# Patient Record
Sex: FEMALE | Race: WHITE | NOT HISPANIC OR LATINO | URBAN - METROPOLITAN AREA
[De-identification: names, ages, dates, MRNs, and addresses within clinical notes are randomized per-mention and may not be internally consistent; named-entity substitution may affect disease eponyms.]

---

## 2017-11-27 LAB — POC BETA-HCG (QUAL): NEGATIVE

## 2019-01-29 ENCOUNTER — HISTORICAL (OUTPATIENT)
Dept: LAB | Facility: HOSPITAL | Age: 39
End: 2019-01-29

## 2019-01-29 LAB
ABS NEUT (OLG): 4.7 X10(3)/MCL (ref 2.1–9.2)
BASOPHILS # BLD AUTO: 0 X10(3)/MCL (ref 0–0.2)
BASOPHILS NFR BLD AUTO: 0 %
EOSINOPHIL # BLD AUTO: 0.6 X10(3)/MCL (ref 0–0.9)
EOSINOPHIL NFR BLD AUTO: 8 %
ERYTHROCYTE [DISTWIDTH] IN BLOOD BY AUTOMATED COUNT: 12.3 % (ref 11.5–17)
HCT VFR BLD AUTO: 43 % (ref 37–47)
HGB BLD-MCNC: 13.6 GM/DL (ref 12–16)
IMM GRANULOCYTES # BLD AUTO: 0.01 % (ref 0–0.02)
IMM GRANULOCYTES NFR BLD AUTO: 0.1 % (ref 0–0.43)
LYMPHOCYTES # BLD AUTO: 1.9 X10(3)/MCL (ref 0.6–4.6)
LYMPHOCYTES NFR BLD AUTO: 25 %
MCH RBC QN AUTO: 30.1 PG (ref 27–31)
MCHC RBC AUTO-ENTMCNC: 31.6 GM/DL (ref 33–36)
MCV RBC AUTO: 95.1 FL (ref 80–94)
MONOCYTES # BLD AUTO: 0.6 X10(3)/MCL (ref 0.1–1.3)
MONOCYTES NFR BLD AUTO: 7 %
NEUTROPHILS # BLD AUTO: 4.7 X10(3)/MCL (ref 1.4–7.9)
NEUTROPHILS NFR BLD AUTO: 60 %
PLATELET # BLD AUTO: 220 X10(3)/MCL (ref 130–400)
PMV BLD AUTO: 10.2 FL (ref 9.4–12.4)
POC BETA-HCG (QUAL): NEGATIVE
RBC # BLD AUTO: 4.52 X10(6)/MCL (ref 4.2–5.4)
T4 FREE SERPL-MCNC: 0.75 NG/DL (ref 0.76–1.46)
TSH SERPL-ACNC: 2.36 MIU/ML (ref 0.36–3.74)
WBC # SPEC AUTO: 7.8 X10(3)/MCL (ref 4.5–11.5)

## 2019-01-30 LAB
HUMAN PAPILLOMAVIRUS (HPV): NORMAL
PAP RECOMMENDATION EXT: NORMAL
PAP SMEAR: NORMAL

## 2019-03-19 ENCOUNTER — HISTORICAL (OUTPATIENT)
Dept: LAB | Facility: HOSPITAL | Age: 39
End: 2019-03-19

## 2019-03-19 LAB
ABS NEUT (OLG): 3.27 X10(3)/MCL (ref 2.1–9.2)
ALBUMIN SERPL-MCNC: 3.8 GM/DL (ref 3.4–5)
ALBUMIN/GLOB SERPL: 1.2 RATIO (ref 1.1–2)
ALP SERPL-CCNC: 53 UNIT/L (ref 46–116)
ALT SERPL-CCNC: 15 UNIT/L (ref 12–78)
APPEARANCE, UA: CLEAR
AST SERPL-CCNC: 11 UNIT/L (ref 15–37)
BACTERIA #/AREA URNS AUTO: ABNORMAL /HPF
BASOPHILS # BLD AUTO: 0 X10(3)/MCL (ref 0–0.2)
BASOPHILS NFR BLD AUTO: 1 %
BILIRUB SERPL-MCNC: 0.7 MG/DL (ref 0.2–1)
BILIRUB UR QL STRIP: NEGATIVE
BILIRUBIN DIRECT+TOT PNL SERPL-MCNC: 0.13 MG/DL (ref 0–0.2)
BILIRUBIN DIRECT+TOT PNL SERPL-MCNC: 0.57 MG/DL (ref 0–0.8)
BUN SERPL-MCNC: 20.2 MG/DL (ref 7–18)
CALCIUM SERPL-MCNC: 9 MG/DL (ref 8.5–10.1)
CHLORIDE SERPL-SCNC: 104 MMOL/L (ref 98–107)
CHOLEST SERPL-MCNC: 209 MG/DL (ref 0–200)
CHOLEST/HDLC SERPL: 3.5 {RATIO} (ref 0–4)
CO2 SERPL-SCNC: 27 MMOL/L (ref 21–32)
COLOR UR: YELLOW
CORTIS SERPL-SCNC: 12 MCG/DL
CREAT SERPL-MCNC: 0.81 MG/DL (ref 0.6–1.3)
EOSINOPHIL # BLD AUTO: 0.4 X10(3)/MCL (ref 0–0.9)
EOSINOPHIL NFR BLD AUTO: 7 %
ERYTHROCYTE [DISTWIDTH] IN BLOOD BY AUTOMATED COUNT: 12.3 % (ref 11.5–17)
EST. AVERAGE GLUCOSE BLD GHB EST-MCNC: 114 MG/DL
GLOBULIN SER-MCNC: 3.2 GM/DL (ref 2.4–3.5)
GLUCOSE (UA): NEGATIVE
GLUCOSE SERPL-MCNC: 95 MG/DL (ref 74–106)
HBA1C MFR BLD: 5.6 % (ref 4.5–6.2)
HCT VFR BLD AUTO: 41.1 % (ref 37–47)
HDLC SERPL-MCNC: 59 MG/DL (ref 40–60)
HGB BLD-MCNC: 13.5 GM/DL (ref 12–16)
HGB UR QL STRIP: NEGATIVE
IMM GRANULOCYTES # BLD AUTO: 0.01 % (ref 0–0.02)
IMM GRANULOCYTES NFR BLD AUTO: 0.2 % (ref 0–0.43)
KETONES UR QL STRIP: NEGATIVE
LDLC SERPL CALC-MCNC: 137 MG/DL (ref 0–129)
LEUKOCYTE ESTERASE UR QL STRIP: NEGATIVE
LYMPHOCYTES # BLD AUTO: 2.2 X10(3)/MCL (ref 0.6–4.6)
LYMPHOCYTES NFR BLD AUTO: 35 %
MCH RBC QN AUTO: 30.1 PG (ref 27–31)
MCHC RBC AUTO-ENTMCNC: 32.8 GM/DL (ref 33–36)
MCV RBC AUTO: 91.5 FL (ref 80–94)
MONOCYTES # BLD AUTO: 0.4 X10(3)/MCL (ref 0.1–1.3)
MONOCYTES NFR BLD AUTO: 7 %
NEUTROPHILS # BLD AUTO: 3.27 X10(3)/MCL (ref 1.4–7.9)
NEUTROPHILS NFR BLD AUTO: 51 %
NITRITE UR QL STRIP.AUTO: NEGATIVE
PH UR STRIP: 7 [PH] (ref 5–9)
PLATELET # BLD AUTO: 231 X10(3)/MCL (ref 130–400)
PMV BLD AUTO: 10 FL (ref 9.4–12.4)
POTASSIUM SERPL-SCNC: 4.4 MMOL/L (ref 3.5–5.1)
PROT SERPL-MCNC: 7 GM/DL (ref 6.4–8.2)
PROT UR QL STRIP: NEGATIVE
RBC # BLD AUTO: 4.49 X10(6)/MCL (ref 4.2–5.4)
RBC #/AREA URNS HPF: ABNORMAL /[HPF]
SODIUM SERPL-SCNC: 141 MMOL/L (ref 136–145)
SP GR UR STRIP: 1.02 (ref 1–1.03)
SQUAMOUS EPITHELIAL, UA: ABNORMAL
T3FREE SERPL-MCNC: 2.7 PG/ML (ref 2.2–4)
T4 FREE SERPL-MCNC: 1.03 NG/DL (ref 0.76–1.46)
TRIGL SERPL-MCNC: 66 MG/DL
TSH SERPL-ACNC: 0.98 MIU/ML (ref 0.36–3.74)
UROBILINOGEN UR STRIP-ACNC: 0.2
VLDLC SERPL CALC-MCNC: 13 MG/DL
WBC # SPEC AUTO: 6.4 X10(3)/MCL (ref 4.5–11.5)
WBC #/AREA URNS AUTO: ABNORMAL /[HPF]

## 2019-03-27 ENCOUNTER — HISTORICAL (OUTPATIENT)
Dept: RADIOLOGY | Facility: HOSPITAL | Age: 39
End: 2019-03-27

## 2019-11-25 ENCOUNTER — HISTORICAL (OUTPATIENT)
Dept: ADMINISTRATIVE | Facility: HOSPITAL | Age: 39
End: 2019-11-25

## 2022-04-10 ENCOUNTER — HISTORICAL (OUTPATIENT)
Dept: ADMINISTRATIVE | Facility: HOSPITAL | Age: 42
End: 2022-04-10

## 2022-04-27 VITALS
HEIGHT: 66 IN | BODY MASS INDEX: 36.21 KG/M2 | SYSTOLIC BLOOD PRESSURE: 106 MMHG | WEIGHT: 225.31 LBS | DIASTOLIC BLOOD PRESSURE: 74 MMHG

## 2022-05-04 NOTE — HISTORICAL [facility]ER
This is a historical note converted from Ne. Formatting and pictures may have been removed.  Please reference Ne for original formatting and attached multimedia. Chief Complaint  referral for left knee pain  History of Present Illness  Patient presents as a new patient. ?States that for the past 6 months, she has had intermittent locking of the knee.? He will get locked in a flexed position and she has to physically press down on the knee and force her to straighten out. ?This happens on a daily basis.? She also has significant pain to the knee?daily.? Is not been getting better or worse over the past 6 months. ?She did have?a venous procedure done?to her bilateral iliac veins?several months ago?because during the work-up for her left knee pain, there was concern that?some of her veins have been collapse.? She got no better from the surgery?and her knee pain persisted. ?She finally got an MRI that which told her that she had?degenerative disease and also a meniscal tear.? She did not have any trauma to the knee. ?No specific inciting event.? She did used to work out?daily, using weights?and also doing?exercises on the floor and also including cardio. ?She is no longer able to do this. ?The only thing that she is able to do is use a stationary bike.? She does not smoke. ?Does not have any other further medical problems. ?She takes Percocet for her pain.  Review of Systems  No chest pain or palpitations  No wheezing or coughing  Physical Exam  Vitals & Measurements  HR:?55(Peripheral)? RR:?12? BP:?106/74?  HT:?167?cm? WT:?102.5?kg? BMI:?36.75? LMP:?11/05/2019 00:00 CST?  No acute distress, alert and oriented  Regular rate on peripheral exam  No increased work of breathing  Abdomen soft nontender nondistended  ?  Left lower extremity  Left knee with a mild effusion  Tender to palpation at the medial joint line  Range of motion 0 to 120 degrees,?10 degrees less than the contralateral  Stable to varus and valgus  stress at 0 30 degrees  Negative Lockmans  Negative posterior sag  No patella symptoms  Positive Angela?and crepitus with motion  Neurovascularly intact distally  ?  X-ray 3 views of left knee reviewed significant for?medial joint space click sclerosis,?with medial osteophytes on the tibia and?distal femur  ?  MRI?report of the left knee?within the referral?paperwork?reviewed significant for?medial meniscal tear  Assessment/Plan  Knee pain, left?M25.562  Ordered:  XR Knee Left 4 or More Views, Routine, 11/25/19 12:36:00 CST, Pain, None, Patient Bed, Rad Type, Knee pain, left, Not Scheduled, 11/25/19 12:36:00 CST  ?  This patient is some degree of arthritis the left knee?but also reports mechanical block to motion?and clicking and catching symptoms.? We would like to see the MRI, as she may be a candidate for arthroscopic meniscal debridement versus?repair.? However, she does have arthritis and I counseled her that?despite?our future interventions,?this may only progress and cause her continued pain.? She will make an appointment for 2 weeks from now and she will get?the MRI disc made and bring it back to her next clinic appointment.   Medications  acetaminophen-hydrocodone 325 mg-10 mg oral tablet, 1 tab(s), Oral, Daily  acetaminophen-hydrocodone 325 mg-5 mg oral tablet, 1 tab(s), Oral, q8hr,? ?Not taking  acetaminophen-oxycodone 325 mg-10 mg oral tablet, 1 tab(s), Oral, q6hr,? ?Not Taking, Completed Rx  buPROPion 150 mg/24 hours (XL) oral tablet, extended release, 150 mg= 1 tab(s), Oral, q24hr  carisoprodol 350 mg oral tablet, 350 mg= 1 tab(s), Oral, BID,? ?Not taking  clonazePAM 1 mg oral tablet, 1 mg= 1 tab(s), Oral, BID,? ?Not taking  clopidogrel 75 mg oral tablet, 75 mg= 1 tab(s), Oral, Daily  levothyroxine 75 mcg (0.075 mg) oral tablet, 75 mcg= 1 tab(s), Oral, qAM  LORAZEPAM 1MG TABLETS, 1 mg= 1 tab(s), Oral, BID  methylPREDNISolone 4 mg oral tab,? ?Not taking  orphenadrine 100 mg oral tablet, extended  release, 100 mg= 1 tab(s), Oral, BID  oxyCODONE 10 mg oral tablet, 10 mg= 1 tab(s), Oral, q6hr,? ?Not taking  Pantoprazole 40 mg ORAL EC-Tablet, 40 mg= 1 tab(s), Oral, Daily  SERTRALINE 100MG TABLETS,? ?Not taking      Renetta Wilkinson medical record was reviewed with?Dr. Chavez.? HPI, PE and treatment plan was reviewed.? Treatment plan was reasonable and necessary.??Imaging was reviewed at the time of visit.??

## 2022-09-21 ENCOUNTER — HISTORICAL (OUTPATIENT)
Dept: ADMINISTRATIVE | Facility: HOSPITAL | Age: 42
End: 2022-09-21

## 2023-05-11 ENCOUNTER — DOCUMENTATION ONLY (OUTPATIENT)
Dept: ADMINISTRATIVE | Facility: HOSPITAL | Age: 43
End: 2023-05-11
Payer: MEDICAID

## 2023-10-19 RX ORDER — CYCLOBENZAPRINE HCL 10 MG
10 TABLET ORAL DAILY
COMMUNITY

## 2023-10-19 RX ORDER — PREDNISONE 1 MG/1
1 TABLET ORAL DAILY
COMMUNITY

## 2023-10-19 RX ORDER — LORAZEPAM 1 MG/1
2 TABLET ORAL DAILY
COMMUNITY

## 2023-10-25 ENCOUNTER — ANESTHESIA EVENT (OUTPATIENT)
Dept: SURGERY | Facility: HOSPITAL | Age: 43
End: 2023-10-25
Payer: MEDICAID

## 2023-10-25 NOTE — ANESTHESIA PREPROCEDURE EVALUATION
10/25/2023  Renetta Gage is a 42 y.o., female.      Pre-op Assessment    I have reviewed the Patient Summary Reports.    I have reviewed the NPO Status.   I have reviewed the Medications.     Review of Systems  Anesthesia Hx:  No problems with previous Anesthesia  Denies Family Hx of Anesthesia complications.   Denies Personal Hx of Anesthesia complications.   Social:  Non-Smoker    Cardiovascular:  Cardiovascular Normal     Pulmonary:  Pulmonary Normal    Renal/:  Renal/ Normal     Hepatic/GI:  Hepatic/GI Normal    Musculoskeletal:  Musculoskeletal Normal    Neurological:  Neurology Normal    Endocrine:  Endocrine Normal    Psych:  Psychiatric Normal           Physical Exam  General: Well nourished, Cooperative, Alert and Oriented    Airway:  Mallampati: I   Mouth Opening: Normal  TM Distance: Normal  Tongue: Normal  Neck ROM: Normal ROM    Dental:  Intact    Chest/Lungs:  Normal Respiratory Rate    Heart:  Rate: Normal    Musculoskeletal:Normal mobility      Anesthesia Plan  Type of Anesthesia, risks & benefits discussed:    Anesthesia Type: MAC  Intra-op Monitoring Plan: Standard ASA Monitors  Post Op Pain Control Plan: multimodal analgesia  Induction:  IV  Informed Consent: Informed consent signed with the Patient and all parties understand the risks and agree with anesthesia plan.  All questions answered.   ASA Score: 2  Day of Surgery Review of History & Physical: H&P Update referred to the surgeon/provider.  Anesthesia Plan Notes: Anesthesia plan was discussed with patient and/or representative. Risks and alternatives were discussed including the possibility of alteration of plan.     Ready For Surgery From Anesthesia Perspective.     .

## 2023-10-26 ENCOUNTER — HOSPITAL ENCOUNTER (OUTPATIENT)
Facility: HOSPITAL | Age: 43
Discharge: HOME | End: 2023-10-26
Attending: INTERNAL MEDICINE | Admitting: INTERNAL MEDICINE
Payer: MEDICAID

## 2023-10-26 ENCOUNTER — ANESTHESIA (OUTPATIENT)
Dept: SURGERY | Facility: HOSPITAL | Age: 43
End: 2023-10-26
Payer: MEDICAID

## 2023-10-26 VITALS
OXYGEN SATURATION: 100 % | HEIGHT: 65 IN | WEIGHT: 155 LBS | DIASTOLIC BLOOD PRESSURE: 71 MMHG | TEMPERATURE: 98 F | RESPIRATION RATE: 16 BRPM | BODY MASS INDEX: 25.83 KG/M2 | HEART RATE: 42 BPM | SYSTOLIC BLOOD PRESSURE: 107 MMHG

## 2023-10-26 DIAGNOSIS — R14.0 ABDOMINAL BLOATING: ICD-10-CM

## 2023-10-26 DIAGNOSIS — M62.89 PELVIC FLOOR DYSFUNCTION IN FEMALE: Primary | ICD-10-CM

## 2023-10-26 DIAGNOSIS — M62.89 PELVIC FLOOR DYSFUNCTION: ICD-10-CM

## 2023-10-26 DIAGNOSIS — R14.3 FLATULENCE: ICD-10-CM

## 2023-10-26 DIAGNOSIS — R19.4 CHANGE IN BOWEL HABITS: ICD-10-CM

## 2023-10-26 DIAGNOSIS — Z80.0 FAMILY HISTORY OF MALIGNANT NEOPLASM OF GASTROINTESTINAL TRACT: ICD-10-CM

## 2023-10-26 LAB — B-HCG SERPL QL: NEGATIVE

## 2023-10-26 PROCEDURE — 00811 ANES LWR INTST NDSC NOS: CPT | Mod: QZ,QS | Performed by: NURSE ANESTHETIST, CERTIFIED REGISTERED

## 2023-10-26 PROCEDURE — 37000009 HC ANESTHESIA EA ADD 15 MINS: Performed by: INTERNAL MEDICINE

## 2023-10-26 PROCEDURE — 45380 COLONOSCOPY AND BIOPSY: CPT | Performed by: INTERNAL MEDICINE

## 2023-10-26 PROCEDURE — 88305 TISSUE EXAM BY PATHOLOGIST: CPT | Performed by: INTERNAL MEDICINE

## 2023-10-26 PROCEDURE — 37000008 HC ANESTHESIA 1ST 15 MINUTES: Performed by: INTERNAL MEDICINE

## 2023-10-26 PROCEDURE — 25000003 PHARM REV CODE 250: Performed by: NURSE ANESTHETIST, CERTIFIED REGISTERED

## 2023-10-26 PROCEDURE — D9220AH HC ANESTHESIA PROFESSIONAL FEE: Mod: QZ,QS | Performed by: NURSE ANESTHETIST, CERTIFIED REGISTERED

## 2023-10-26 PROCEDURE — 88300 SURGICAL PATH GROSS: CPT

## 2023-10-26 PROCEDURE — 81025 URINE PREGNANCY TEST: CPT | Performed by: NURSE ANESTHETIST, CERTIFIED REGISTERED

## 2023-10-26 PROCEDURE — 63600175 PHARM REV CODE 636 W HCPCS: Performed by: NURSE ANESTHETIST, CERTIFIED REGISTERED

## 2023-10-26 PROCEDURE — 27201423 OPTIME MED/SURG SUP & DEVICES STERILE SUPPLY: Performed by: INTERNAL MEDICINE

## 2023-10-26 RX ORDER — SODIUM CHLORIDE 9 MG/ML
INJECTION, SOLUTION INTRAVENOUS CONTINUOUS
Status: ACTIVE | OUTPATIENT
Start: 2023-10-26

## 2023-10-26 RX ORDER — PROPOFOL 10 MG/ML
VIAL (ML) INTRAVENOUS
Status: DISCONTINUED | OUTPATIENT
Start: 2023-10-26 | End: 2023-10-26

## 2023-10-26 RX ORDER — ONDANSETRON 2 MG/ML
INJECTION INTRAMUSCULAR; INTRAVENOUS
Status: DISCONTINUED | OUTPATIENT
Start: 2023-10-26 | End: 2023-10-26

## 2023-10-26 RX ORDER — LIDOCAINE HYDROCHLORIDE 10 MG/ML
INJECTION, SOLUTION EPIDURAL; INFILTRATION; INTRACAUDAL; PERINEURAL
Status: DISCONTINUED | OUTPATIENT
Start: 2023-10-26 | End: 2023-10-26

## 2023-10-26 RX ADMIN — SODIUM CHLORIDE: 9 INJECTION, SOLUTION INTRAVENOUS at 09:10

## 2023-10-26 RX ADMIN — PROPOFOL 150 MG: 10 INJECTION, EMULSION INTRAVENOUS at 09:10

## 2023-10-26 RX ADMIN — ONDANSETRON HYDROCHLORIDE 4 MG: 2 SOLUTION INTRAMUSCULAR; INTRAVENOUS at 09:10

## 2023-10-26 RX ADMIN — LIDOCAINE HYDROCHLORIDE 20 MG: 10 INJECTION, SOLUTION EPIDURAL; INFILTRATION; INTRACAUDAL; PERINEURAL at 09:10

## 2023-10-26 NOTE — OP NOTE
[facility]  Colon Procedure  Operative Note    SUMMARY     Date of Procedure: 10/26/2023     Procedure: Procedure(s) (LRB):  COLONOSCOPY (N/A)    Surgeon(s) and Role:     * Miguel De Oliveira MD - Primary    Assisting Surgeon: None    Patient Name: Renetta Gage      Patient location: OPS    Patient : 1980    Patient Gender: female    Referring Physician: Akanksha Rowe     Anesthesia Provider: No responsible provider has been recorded for the case.     Pre-Operative Diagnosis: Change in bowel habits [R19.4]  Abdominal bloating [R14.0]  Flatulence [R14.3]  Family history of malignant neoplasm of gastrointestinal tract [Z80.0]  Pelvic floor dysfunction [M62.89]    Post-Operative Diagnosis: Post-Op Diagnosis Codes:     * Change in bowel habits [R19.4]     * Abdominal bloating [R14.0]     * Flatulence [R14.3]     * Family history of malignant neoplasm of gastrointestinal tract [Z80.0]     * Pelvic floor dysfunction [M62.89]       Indications:  Abdominal pain, markedly irregular bowel pattern, possible inflammatory bowel disease      Procedure:   This is a patient undergoing a diagnostic colonoscopy. Prior colonoscopy was preformed on this is 1st exam. The procedure, indications, preparation and potential complications were explained to the patient, who indicated understanding and signed the corresponding consent forms. Patient identifications and proposed procedure were verified by the physician, the nurse and the anesthesia staff in the endoscopy suite. IV sedation was administered by the nurse anesthetist. Continuous pulse oximetry and blood pressure monitoring were used throughout the procedure. Supplemental oxygen was used. The quality of preparation was excellent. Patient was placed in left lateral decubitus position. Digital  exam was none previous unremarkable. The colonoscope was introduced through the rectum and advanced under direct visualization until cecum  reached. The appendiceal orifice and the ileocecal valve were identified. The terminal ileum was identified. Patience tolerance to procedure was good. The procedure was not difficult.      Limitations/Complications:  None  Complications: No    Findings:                 Colon:  Colonoscopy was carried out without difficulty.  There was slight redundancy that was overcome with abdominal pressure.  The ileum was cannulated and demonstrated mild inflammatory change that was nonspecific.  The colon was remarkable for what appeared to be Strongyloides.  The remainder of examination was unremarkable.    Other Interventions:  Biopsies of the ileum proximal and distal colon were obtained for histological evaluation to rule out any microscopic changes that would contribute to the patient's symptoms.    Diagnostic Impression:  Parasitic infestation, suspected Strongyloides, nonspecific mild inflammatory change of terminal ileum    Assessment & Plan:  Await pathology report and consider ivermectin treatment    Additional Notes:  Blood loss minimal                Miguel De Oliveira MD  10/26/2023

## 2023-10-26 NOTE — DISCHARGE INSTRUCTIONS
Do not drive, operate heavy machinery, or sign important documents for 24 hours.    Drink plenty of fluids and stay out of the sun and heat.    Dr. De Oliveira's office will call you with the results from your procedure.

## 2023-10-26 NOTE — ANESTHESIA POSTPROCEDURE EVALUATION
Anesthesia Post Evaluation    Patient: Renetta Gage    Procedure(s) Performed: Procedure(s) (LRB):  COLONOSCOPY (N/A)    Final Anesthesia Type: MAC      Patient location during evaluation: floor  Patient participation: Yes- Able to Participate  Level of consciousness: awake and alert  Post-procedure vital signs: reviewed and stable  Pain management: adequate  Airway patency: patent    PONV status at discharge: No PONV  Anesthetic complications: no      Cardiovascular status: blood pressure returned to baseline  Respiratory status: unassisted  Hydration status: euvolemic  Follow-up not needed.          Vitals Value Taken Time   BP  10/26/23 0951   Temp  10/26/23 0951   Pulse  10/26/23 0951   Resp  10/26/23 0951   SpO2  10/26/23 0951         No case tracking events are documented in the log.      Pain/Valentina Score: No data recorded

## 2023-10-26 NOTE — PLAN OF CARE
Pre-op diagnosis: abdominal pain, bowel irregularity    Post op dx: intestinal parasite; otherwise normal colon exam

## 2023-10-26 NOTE — DISCHARGE SUMMARY
[facility] Services  Discharge Note  Short Stay    Procedure(s) (LRB):  COLONOSCOPY (N/A)      OUTCOME: Patient tolerated treatment/procedure well without complication and is now ready for discharge.    DISPOSITION: Home or Self Care    FINAL DIAGNOSIS:  Parasitic infestation    FOLLOWUP: With primary care provider    DISCHARGE INSTRUCTIONS:  Ivermectin 3 mg tablets to take 3 on day 1 and 3 on day 2      Clinical Reference Documents Added to Patient Instructions         Document    COLONOSCOPY DISCHARGE INSTRUCTIONS (ENGLISH)            TIME SPENT ON DISCHARGE:  10 minutes

## 2023-10-31 LAB — PSYCHE PATHOLOGY RESULT: NORMAL

## 2024-01-22 ENCOUNTER — LAB REQUISITION (OUTPATIENT)
Dept: LAB | Facility: HOSPITAL | Age: 44
End: 2024-01-22
Payer: MEDICAID

## 2024-01-22 DIAGNOSIS — R19.4 CHANGE IN BOWEL HABIT: ICD-10-CM

## 2024-01-22 DIAGNOSIS — R19.7 DIARRHEA, UNSPECIFIED: ICD-10-CM

## 2024-01-22 PROCEDURE — 87209 SMEAR COMPLEX STAIN: CPT | Performed by: INTERNAL MEDICINE

## 2024-01-23 LAB — O+P STL MICRO: NORMAL

## 2024-04-05 ENCOUNTER — LAB REQUISITION (OUTPATIENT)
Dept: LAB | Facility: HOSPITAL | Age: 44
End: 2024-04-05
Payer: MEDICAID

## 2024-04-05 DIAGNOSIS — R19.7 DIARRHEA, UNSPECIFIED: ICD-10-CM

## 2024-04-05 LAB
C DIFF TOX A+B STL QL IA: NEGATIVE
CLOSTRIDIUM DIFFICILE GDH ANTIGEN (OHS): NEGATIVE

## 2024-04-05 PROCEDURE — 86318 IA INFECTIOUS AGENT ANTIBODY: CPT | Performed by: INTERNAL MEDICINE

## 2024-05-01 ENCOUNTER — APPOINTMENT (OUTPATIENT)
Dept: LAB | Facility: HOSPITAL | Age: 44
End: 2024-05-01
Attending: INTERNAL MEDICINE
Payer: MEDICAID

## 2024-05-01 DIAGNOSIS — R14.1 FLATULENCE, ERUCTATION, AND GAS PAIN: ICD-10-CM

## 2024-05-01 DIAGNOSIS — R14.3 FLATULENCE, ERUCTATION, AND GAS PAIN: ICD-10-CM

## 2024-05-01 DIAGNOSIS — M62.89 MYOFIBROSIS: Primary | ICD-10-CM

## 2024-05-01 DIAGNOSIS — K58.9 IRRITABLE BOWEL SYNDROME, UNSPECIFIED TYPE: ICD-10-CM

## 2024-05-01 DIAGNOSIS — R14.2 FLATULENCE, ERUCTATION, AND GAS PAIN: ICD-10-CM

## 2024-05-01 DIAGNOSIS — Z80.0 FAMILY HISTORY OF MALIGNANT NEOPLASM OF GASTROINTESTINAL TRACT: ICD-10-CM

## 2024-05-01 DIAGNOSIS — K59.9 FUNCTIONAL DISORDER OF INTESTINE: ICD-10-CM

## 2024-05-01 DIAGNOSIS — R19.4 FREQUENT BOWEL MOVEMENTS: ICD-10-CM

## 2024-05-01 PROCEDURE — 86316 IMMUNOASSAY TUMOR OTHER: CPT

## 2024-05-01 PROCEDURE — 36415 COLL VENOUS BLD VENIPUNCTURE: CPT

## 2024-05-02 LAB — CGA SERPL-MCNC: 48 NG/ML

## (undated) DEVICE — TUBE WATER AUXILIARY

## (undated) DEVICE — PACK ENDOSCOPY GENERAL

## (undated) DEVICE — FORCEP ALLIGATOR BX NDL 2.8MM

## (undated) DEVICE — SOL STERILE WATER INJ 1000ML

## (undated) DEVICE — INJECTION NDL 23G/4MM SZ 2.8MM